# Patient Record
Sex: MALE | Race: WHITE | URBAN - METROPOLITAN AREA
[De-identification: names, ages, dates, MRNs, and addresses within clinical notes are randomized per-mention and may not be internally consistent; named-entity substitution may affect disease eponyms.]

---

## 2025-04-03 ENCOUNTER — OFFICE VISIT (OUTPATIENT)
Age: 50
End: 2025-04-03

## 2025-04-03 VITALS
WEIGHT: 217 LBS | BODY MASS INDEX: 29.39 KG/M2 | OXYGEN SATURATION: 95 % | SYSTOLIC BLOOD PRESSURE: 120 MMHG | TEMPERATURE: 99.1 F | DIASTOLIC BLOOD PRESSURE: 79 MMHG | HEIGHT: 72 IN | HEART RATE: 69 BPM

## 2025-04-03 DIAGNOSIS — Z12.11 SCREENING FOR COLON CANCER: Primary | ICD-10-CM

## 2025-04-03 PROCEDURE — NBSRV NON-BILLABLE SERVICE: Performed by: NURSE PRACTITIONER

## 2025-04-03 RX ORDER — ROSUVASTATIN CALCIUM 10 MG/1
10 TABLET, COATED ORAL DAILY
COMMUNITY
Start: 2025-03-20

## 2025-04-03 ASSESSMENT — ENCOUNTER SYMPTOMS
RHINORRHEA: 0
SHORTNESS OF BREATH: 0
COUGH: 0
SORE THROAT: 0

## 2025-04-03 NOTE — PROGRESS NOTES
atraumatic.      Right Ear: External ear normal.      Left Ear: External ear normal.      Nose: Nose normal.   Eyes:      Conjunctiva/sclera: Conjunctivae normal.   Neck:      Trachea: No tracheal deviation.   Cardiovascular:      Rate and Rhythm: Normal rate.   Pulmonary:      Effort: Pulmonary effort is normal. No accessory muscle usage or respiratory distress.   Abdominal:      General: Bowel sounds are normal.      Palpations: Abdomen is soft.      Tenderness: There is no abdominal tenderness.   Skin:     General: Skin is warm and dry.   Neurological:      Mental Status: He is alert and oriented to person, place, and time.   Psychiatric:         Attention and Perception: Attention normal.         Mood and Affect: Mood normal.         Speech: Speech normal.         Behavior: Behavior normal.         Thought Content: Thought content normal.                Data  No results found for: \"WBC\", \"HGB\", \"HCT\", \"MCV\", \"PLT\"  No results found for: \"NA\", \"K\", \"CL\", \"CO2\", \"BUN\", \"CREATININE\", \"GLUCOSE\", \"CALCIUM\", \"LABALBU\", \"BILITOT\", \"ALKPHOS\", \"AST\", \"ALT\", \"LABGLOM\", \"GFRAA\", \"AGRATIO\", \"GLOB\"        Radiology Review:    CT Result (most recent):  No results found for this or any previous visit from the past 3650 days.      ASSESSMENT   49 y.o. male to be scheduled for colonoscopy   No prior colonoscopies  Patient has been feeling well over all, states bowels moving normally, no GI complaints.  Major medical hx: None  Prior ABD/pelvic surgeries: None  Blood thinning medications: None    PLAN  Patient to be scheduled for colonoscopy as detailed below:  Prep instructions discussed with patient, copy of instructions provided  Will follow up with patient in the office post procedure to discuss findings and further recommendations    SURGERY/PROCEDURE SCHEDULING  PROCEDURE NAME: Screening colonoscopy    PROCEDURE DIAGNOSIS: Screening for colon cancer    ANESTHESIA TYPE: General    BLOOD THINNERS: None    CLEARANCE:

## 2025-04-03 NOTE — PATIENT INSTRUCTIONS
The office will be calling you soon to schedule colonoscopy, please call our office if you do not receive a phone call within 1 week.        [facility]  Jabari Gan MD, FACS  Madhavi Fagan, ALIREZA-CNP  [facility], [address]  [place] [address]  Phone: [phone]  Fax: [phone]    Miralax (Polyethylene Glycol)  STOP Aspirin 7 Days prior to Procedure  STOP all other Blood Thinners 5 Days prior to Procedure    **DO NOT EAT ANY SOLID FOOD THE DAY BEFORE YOUR PROCEDURE**  **YOU MUST BE ON A CLEAR LIQUID DIET ONLY**    Approved Clear Liquids:  Any flavor of soda except Red or Purple  Fruit Juice without pulp  Coffee or tea without dairy products  Jell-O without fruit or toppings, No Red or Purple  Pop-kaushik or Italian Ice, No Red or Purple  Chicken or Beef broth and bouillon      DRINK PLENTY OF WATER THROUGHOUT THE DAY    At 10:00 am the day before your procedure, take all 4 Dulcolax Tablets.  At 6:00 pm the day before your procedure, mix the ENTIRE bottle of Miralax (238 gram or Close to it) with 64 ounces of Gatorade (NOT RED or PURPLE).  You must consume the entire 64 ounces by 8:00 pm.  Continue clear liquid diet up until midnight.    NOTHING TO EAT, DRINK, SMOKE, OR CHEW AFTER MIDNIGHT    You may brush your teeth, rinse, gargle, and spit.  Heart or Blood pressure medications ONLY with a small sip of water, unless otherwise directed.  You will be scheduled for a pre-admission phone call prior to your procedure for your chart to be reviewed with a nurse to give you more specific instructions.  Shower with regular soap and water.    YOU MUST HAVE AN ADULT EITHER DRIVE YOU OR RIDE IN A CAB WITH YOU

## 2025-04-09 ENCOUNTER — TELEPHONE (OUTPATIENT)
Age: 50
End: 2025-04-09

## 2025-04-09 ENCOUNTER — PREP FOR PROCEDURE (OUTPATIENT)
Age: 50
End: 2025-04-09

## 2025-04-09 DIAGNOSIS — Z12.11 COLON CANCER SCREENING: ICD-10-CM

## 2025-04-09 RX ORDER — BISACODYL 5 MG
TABLET, DELAYED RELEASE (ENTERIC COATED) ORAL
Qty: 4 TABLET | Refills: 0 | Status: SHIPPED | OUTPATIENT
Start: 2025-04-09

## 2025-04-09 RX ORDER — POLYETHYLENE GLYCOL 3350 17 G/17G
POWDER, FOR SOLUTION ORAL
Qty: 238 G | Refills: 0 | Status: SHIPPED | OUTPATIENT
Start: 2025-04-09

## 2025-04-09 RX ORDER — ONDANSETRON 4 MG/1
4 TABLET, FILM COATED ORAL EVERY 6 HOURS PRN
Qty: 10 TABLET | Refills: 0 | Status: SHIPPED | OUTPATIENT
Start: 2025-04-09

## 2025-04-09 NOTE — TELEPHONE ENCOUNTER
Patient called to schedule procedure  Pre op and bowel prep instructions discussed  Bowel prep order to [facility]    EM/SC/6-2-25 at 8:00am/Screening Colonoscopy/epic        PRE OP AND BOWEL PREP INSTRUCTIONS    STOP Aspirin 7 Days prior to Procedure  STOP all other Blood Thinners 5 Days prior to Procedure      **DO NOT EAT ANY SOLID FOOD THE DAY BEFORE YOUR PROCEDURE**  **YOU MUST BE ON A CLEAR LIQUID DIET ONLY**    Approved Clear Liquids:  Any flavor of water or soda except Red or Purple  Fruit Juice without pulp  Coffee or tea without dairy products  Jell-O without fruit or toppings, No Red or Purple  Pop-kaushik or Italian Ice, No Red or Purple  Chicken or Beef broth and bouillon      DRINK PLENTY OF WATER THROUGHOUT THE DAY    At 10:00 am the day before your procedure, take all 4 Dulcolax Tablets.  At 6:00 pm the day before your procedure, mix the ENTIRE bottle of Miralax (238 grams or 8.3oz) with 64 ounces of Gatorade, Gatorade Zero or Propel Water (NOT RED or PURPLE).  You must consume the entire 64 ounces by 8:00 pm.  Continue clear liquid diet up until midnight.    NOTHING TO EAT, DRINK, SMOKE, OR CHEW AFTER MIDNIGHT    You may brush your teeth, rinse, gargle, and spit.  Heart or Blood pressure medications ONLY with a small sip of water, unless otherwise directed.  Shower with regular soap and water.    YOU MUST HAVE AN ADULT EITHER DRIVE YOU OR RIDE IN A CAB WITH YOU        MY PROCEDURE IS SCHEDULED FOR: Screening Colonoscopy    Date of Procedure: 6/2/25  Time: 8:00am  Arrival Time: 6:00am  Pre-Admission Testing Nurse Phone Call Assessment: 5/27/25 at 8:30am  Location:  [facility]     Your scripts have been sent to: [facility]    Follow Up Appointment at [facility] General Surgery: 6/16/25 at 8:30am

## 2025-04-16 RX ORDER — ONDANSETRON 4 MG/1
4 TABLET, FILM COATED ORAL EVERY 6 HOURS PRN
Qty: 10 TABLET | Refills: 0 | OUTPATIENT
Start: 2025-04-16

## 2025-05-09 PROBLEM — Z12.11 COLON CANCER SCREENING: Status: RESOLVED | Noted: 2025-04-09 | Resolved: 2025-05-09

## 2025-05-21 ENCOUNTER — TELEPHONE (OUTPATIENT)
Age: 50
End: 2025-05-21

## 2025-05-21 PROBLEM — Z12.11 COLON CANCER SCREENING: Status: ACTIVE | Noted: 2025-04-09

## 2025-05-21 NOTE — TELEPHONE ENCOUNTER
Patient called to reschedule procedure due to Daughter's Graduation.   New Procedure Date: 6/23/25   Procedure Time: 11:00am  Arrival Time: 9:00am  New Follow up date: 7/7/25 at 10:00am    Patient has already picked up Bowel Prep

## 2025-06-16 ENCOUNTER — HOSPITAL ENCOUNTER (OUTPATIENT)
Dept: PREADMISSION TESTING | Age: 50
Discharge: HOME | End: 2025-06-20

## 2025-06-16 VITALS — HEIGHT: 72 IN | WEIGHT: 217 LBS | BODY MASS INDEX: 29.39 KG/M2

## 2025-06-16 NOTE — PROGRESS NOTES

## 2025-06-19 NOTE — PRE-PROCEDURE INSTRUCTIONS
No answer, left message ?                             Unable to leave message ?    When were you told to arrive at hospital ?  0900    Do you have a  ?y    Are you on any blood thinners ?                     If yes when did you stop taking ?    Do you have your prep Rx filled and instruction ?      Nothing to eat the day before , only clear liquids.y    Are you experiencing any covid symptoms ?     Do you have any infections or rash we should be aware of ?      Do you have the Hibiclens soap to use the night before and the morning of surgery ?    Nothing to eat or drink after midnight, only a sip of water to take any medication instructed to take the night before.yy  Wear comfortable clothing, leave any valuables at home, remove any jewelry and body piercing . y

## 2025-06-20 ENCOUNTER — ANESTHESIA EVENT (OUTPATIENT)
Dept: ENDOSCOPY | Age: 50
End: 2025-06-20
Payer: COMMERCIAL

## 2025-06-20 PROBLEM — Z12.11 COLON CANCER SCREENING: Status: RESOLVED | Noted: 2025-04-09 | Resolved: 2025-06-20

## 2025-06-23 ENCOUNTER — ANESTHESIA (OUTPATIENT)
Dept: ENDOSCOPY | Age: 50
End: 2025-06-23
Payer: COMMERCIAL

## 2025-06-23 ENCOUNTER — HOSPITAL ENCOUNTER (OUTPATIENT)
Age: 50
Setting detail: OUTPATIENT SURGERY
Discharge: HOME | End: 2025-06-23
Attending: SURGERY | Admitting: SURGERY
Payer: COMMERCIAL

## 2025-06-23 VITALS
TEMPERATURE: 97.2 F | HEIGHT: 72 IN | SYSTOLIC BLOOD PRESSURE: 101 MMHG | RESPIRATION RATE: 16 BRPM | HEART RATE: 52 BPM | WEIGHT: 203 LBS | BODY MASS INDEX: 27.5 KG/M2 | OXYGEN SATURATION: 98 % | DIASTOLIC BLOOD PRESSURE: 72 MMHG

## 2025-06-23 DIAGNOSIS — Z12.11 COLON CANCER SCREENING: ICD-10-CM

## 2025-06-23 PROCEDURE — 7100000000 HC PACU RECOVERY - FIRST 15 MIN: Performed by: SURGERY

## 2025-06-23 PROCEDURE — 3700000001 HC ADD 15 MINUTES (ANESTHESIA): Performed by: SURGERY

## 2025-06-23 PROCEDURE — 6360000002 HC RX W HCPCS: Performed by: NURSE ANESTHETIST, CERTIFIED REGISTERED

## 2025-06-23 PROCEDURE — 3700000000 HC ANESTHESIA ATTENDED CARE: Performed by: SURGERY

## 2025-06-23 PROCEDURE — 88305 TISSUE EXAM BY PATHOLOGIST: CPT

## 2025-06-23 PROCEDURE — 7100000031 HC ASPR PHASE II RECOVERY - ADDTL 15 MIN: Performed by: SURGERY

## 2025-06-23 PROCEDURE — 7100000010 HC PHASE II RECOVERY - FIRST 15 MIN: Performed by: SURGERY

## 2025-06-23 PROCEDURE — 6360000002 HC RX W HCPCS: Performed by: ANESTHESIOLOGY

## 2025-06-23 PROCEDURE — 2580000003 HC RX 258: Performed by: ANESTHESIOLOGY

## 2025-06-23 PROCEDURE — 7100000030 HC ASPR PHASE II RECOVERY - FIRST 15 MIN: Performed by: SURGERY

## 2025-06-23 PROCEDURE — 7100000011 HC PHASE II RECOVERY - ADDTL 15 MIN: Performed by: SURGERY

## 2025-06-23 PROCEDURE — 7100000001 HC PACU RECOVERY - ADDTL 15 MIN: Performed by: SURGERY

## 2025-06-23 PROCEDURE — 2709999900 HC NON-CHARGEABLE SUPPLY: Performed by: SURGERY

## 2025-06-23 PROCEDURE — 3609010300 HC COLONOSCOPY W/BIOPSY SINGLE/MULTIPLE: Performed by: SURGERY

## 2025-06-23 RX ORDER — SIMETHICONE 40MG/0.6ML
SUSPENSION, DROPS(FINAL DOSAGE FORM)(ML) ORAL PRN
Status: DISCONTINUED | OUTPATIENT
Start: 2025-06-23 | End: 2025-06-23 | Stop reason: ALTCHOICE

## 2025-06-23 RX ORDER — LIDOCAINE HYDROCHLORIDE 10 MG/ML
1 INJECTION, SOLUTION EPIDURAL; INFILTRATION; INTRACAUDAL; PERINEURAL
Status: COMPLETED | OUTPATIENT
Start: 2025-06-23 | End: 2025-06-23

## 2025-06-23 RX ORDER — LIDOCAINE HYDROCHLORIDE 10 MG/ML
INJECTION, SOLUTION INFILTRATION; PERINEURAL
Status: DISCONTINUED | OUTPATIENT
Start: 2025-06-23 | End: 2025-06-23 | Stop reason: SDUPTHER

## 2025-06-23 RX ORDER — SODIUM CHLORIDE, SODIUM LACTATE, POTASSIUM CHLORIDE, CALCIUM CHLORIDE 600; 310; 30; 20 MG/100ML; MG/100ML; MG/100ML; MG/100ML
INJECTION, SOLUTION INTRAVENOUS CONTINUOUS
Status: DISCONTINUED | OUTPATIENT
Start: 2025-06-23 | End: 2025-06-23 | Stop reason: HOSPADM

## 2025-06-23 RX ORDER — SODIUM CHLORIDE 0.9 % (FLUSH) 0.9 %
5-40 SYRINGE (ML) INJECTION PRN
Status: DISCONTINUED | OUTPATIENT
Start: 2025-06-23 | End: 2025-06-23 | Stop reason: HOSPADM

## 2025-06-23 RX ORDER — ONDANSETRON 2 MG/ML
INJECTION INTRAMUSCULAR; INTRAVENOUS
Status: DISCONTINUED | OUTPATIENT
Start: 2025-06-23 | End: 2025-06-23 | Stop reason: SDUPTHER

## 2025-06-23 RX ORDER — PROPOFOL 10 MG/ML
INJECTION, EMULSION INTRAVENOUS
Status: DISCONTINUED | OUTPATIENT
Start: 2025-06-23 | End: 2025-06-23 | Stop reason: SDUPTHER

## 2025-06-23 RX ORDER — DEXAMETHASONE SODIUM PHOSPHATE 4 MG/ML
INJECTION, SOLUTION INTRA-ARTICULAR; INTRALESIONAL; INTRAMUSCULAR; INTRAVENOUS; SOFT TISSUE
Status: DISCONTINUED | OUTPATIENT
Start: 2025-06-23 | End: 2025-06-23 | Stop reason: SDUPTHER

## 2025-06-23 RX ORDER — SODIUM CHLORIDE 0.9 % (FLUSH) 0.9 %
5-40 SYRINGE (ML) INJECTION EVERY 12 HOURS SCHEDULED
Status: DISCONTINUED | OUTPATIENT
Start: 2025-06-23 | End: 2025-06-23 | Stop reason: HOSPADM

## 2025-06-23 RX ORDER — SODIUM CHLORIDE 9 MG/ML
INJECTION, SOLUTION INTRAVENOUS PRN
Status: DISCONTINUED | OUTPATIENT
Start: 2025-06-23 | End: 2025-06-23 | Stop reason: HOSPADM

## 2025-06-23 RX ADMIN — LIDOCAINE HYDROCHLORIDE 50 MG: 10 INJECTION, SOLUTION INFILTRATION; PERINEURAL at 11:39

## 2025-06-23 RX ADMIN — LIDOCAINE HYDROCHLORIDE 1 ML: 10 INJECTION, SOLUTION EPIDURAL; INFILTRATION; INTRACAUDAL; PERINEURAL at 09:20

## 2025-06-23 RX ADMIN — PROPOFOL 200 MG: 10 INJECTION, EMULSION INTRAVENOUS at 11:39

## 2025-06-23 RX ADMIN — SODIUM CHLORIDE, SODIUM LACTATE, POTASSIUM CHLORIDE, AND CALCIUM CHLORIDE: .6; .31; .03; .02 INJECTION, SOLUTION INTRAVENOUS at 09:21

## 2025-06-23 RX ADMIN — DEXAMETHASONE SODIUM PHOSPHATE 4 MG: 4 INJECTION, SOLUTION INTRAMUSCULAR; INTRAVENOUS at 11:48

## 2025-06-23 RX ADMIN — ONDANSETRON 4 MG: 2 INJECTION, SOLUTION INTRAMUSCULAR; INTRAVENOUS at 11:48

## 2025-06-23 ASSESSMENT — PAIN - FUNCTIONAL ASSESSMENT
PAIN_FUNCTIONAL_ASSESSMENT: 0-10
PAIN_FUNCTIONAL_ASSESSMENT: 0-10
PAIN_FUNCTIONAL_ASSESSMENT: NONE - DENIES PAIN

## 2025-06-23 ASSESSMENT — ENCOUNTER SYMPTOMS
COUGH: 0
SHORTNESS OF BREATH: 0
SORE THROAT: 0

## 2025-06-23 ASSESSMENT — PAIN SCALES - GENERAL
PAINLEVEL_OUTOF10: 0
PAINLEVEL_OUTOF10: 0

## 2025-06-23 NOTE — ANESTHESIA POSTPROCEDURE EVALUATION
Department of Anesthesiology  Postprocedure Note    Patient: Juan Pablo Morales  MRN: [id number]  YOB: 1975  Date of evaluation: 6/23/2025    Procedure Summary       Date: 06/23/25 Room / Location: [place] [address] / [facility]    Anesthesia Start: 1136 Anesthesia Stop: 1225    Procedure: COLONOSCOPY BIOPSY Diagnosis:       Colon cancer screening      (Colon cancer screening [Z12.11])    Surgeons: Jabari Gan MD Responsible Provider: Rachel Morrow MD    Anesthesia Type: general ASA Status: 2            Anesthesia Type: No value filed.    Fabricio Phase I: Fabricio Score: 10    Fabricio Phase II: Fabricio Score: 10    Anesthesia Post Evaluation    Comments: POST- ANESTHESIA EVALUATION       Pt Name: Juan Pablo Morales  MRN: [id number]  YOB: 1975  Date of evaluation: 6/23/2025  Time:  2:01 PM      /72   Pulse 52   Temp 97.2 °F (36.2 °C) (Infrared)   Resp 16   Ht 1.829 m (6')   Wt 92.1 kg (203 lb)   SpO2 98%   BMI 27.53 kg/m²      Consciousness Level  Awake  Cardiopulmonary Status  Stable  Pain Adequately Treated YES  Nausea / Vomiting  NO  Adequate Hydration  YES  Anesthesia Related Complications NONE      Electronically signed by Rachel Morrow MD on 6/23/2025 at 2:01 PM      No notable events documented.

## 2025-06-23 NOTE — H&P
HISTORY and PHYSICAL  [facility]       NAME:  Juan Pablo Morales  MRN: [id number]   YOB: 1975   Date: 6/23/2025   Age: 49 y.o.  Gender: male       COMPLAINT AND PRESENT HISTORY:       Juan Pablo Morales is 49 y.o.  male, here for     Procedure(s):  COLORECTAL CANCER SCREENING, NOT HIGH RISK    Pre-Op Diagnosis Codes:      * Colon cancer screening [Z12.11]    Below italics is a portion of office visit note by Madhavi Fagan CNP dated 04/03/25: Reviewed   HISTORY OF PRESENT ILLNESS: 49 y.o. male presents to discuss scheduling a colonoscopy.      Colonoscopy:  Last Colonoscopy Date (if applicable):  None prior  Rectal Bleeding: no  Abdominal Pain: no  Changes in Bowel Habits: no  Nausea / Vomiting: no  Weight Loss: no  Family Hx of Colon CA: no     Major medical hx: None  Prior ABD/pelvic surgeries: None  Blood thinning medications: None    UPDATE  Denies changes to GI symptoms since office visit in April, 2025.     Completed and followed prescribed prep. NPO p MN. Denies taking any blood thinning medications. Denies recent or current chest pain/pressure, palpitations, SOB, recent URI, fever or chills.       RECENT LABS, IMAGING AND TESTING     No results found for: \"WBC\", \"RBC\", \"HGB\", \"HCT\", \"MCV\", \"MCH\", \"MCHC\", \"RDW\", \"PLT\", \"MPV\"     No results found for: \"NA\", \"K\", \"CL\", \"CO2\", \"BUN\", \"CREATININE\", \"GLUCOSE\", \"CALCIUM\", \"LABALBU\", \"BILITOT\", \"ALKPHOS\", \"AST\", \"ALT\"    No results found.    PAST MEDICAL HISTORY     Past Medical History:   Diagnosis Date    Hyperlipidemia        SURGICAL HISTORY       Past Surgical History:   Procedure Laterality Date    TONSILLECTOMY         FAMILY HISTORY     History reviewed. No pertinent family history.    SOCIAL HISTORY       Social History     Socioeconomic History    Marital status:      Spouse name: None    Number of children: None    Years of education: None    Highest education level: None   Tobacco Use    Smoking status: Never    Smokeless

## 2025-06-23 NOTE — OP NOTE
[facility] Surgery   Jabari Gan MD, FACS  Madhavi Fagan, APRN-CNP  [address], [address]  [place] [address]  P: [phone], F: [phone]    PROCEDURE NOTE    DATE OF PROCEDURE: 6/23/2025    SURGEON: Jabari Gan MD    ASSISTANT: None    PREOPERATIVE DIAGNOSIS: Screening colonoscopy    POSTOPERATIVE DIAGNOSIS: Ascending colon polyp.  Sigmoid diverticulosis.  Large hypertrophied anal papilla    OPERATION: Total colonoscopy to cecum with intubation of terminal ileum.  Ascending colon polypectomy with cold biopsy forceps    ANESTHESIA: General    ESTIMATED BLOOD LOSS: None    COMPLICATIONS: None     SPECIMENS:  Was Obtained: Ascending colon polyp    HISTORY: The patient is a 49 y.o. year old male with history of above preop diagnosis.  I recommended colonoscopy with possible biopsy or polypectomy and I explained the risk, benefits, expected outcome, and alternatives to the procedure.  Risks included but are not limited to bleeding, infection, respiratory distress, hypotension, and perforation of the colon and possibility of missing a lesion.  The patient understands and is in agreement.      PROCEDURE: The patient was given IV conscious sedation.  The patient's SPO2 remained above 90% throughout the procedure. Digital rectal exam was normal.  The colonoscope was inserted through the anus into the rectum and advanced under direct vision to the cecum without difficulty.  Terminal ileum was examined for approximately 2 inches.  The prep was good.      Findings:  Terminal ileum: normal    Cecum/Ascending colon: abnormal: Proximal ascending colon polyp removed with cold biopsy forceps    Transverse colon: normal    Descending/Sigmoid colon: abnormal: Sigmoid diverticulosis    Rectum/Anus: examined in normal and retroflexed positions and was abnormal: Large hypertrophied anal papilla    Withdrawal Time was (minutes): 18      Next screening colonoscopy: 5 years.  If screening is

## 2025-06-23 NOTE — ANESTHESIA PRE PROCEDURE
Department of Anesthesiology  Preprocedure Note       Name:  Juan Pablo Morales   Age:  49 y.o.  :  1975                                          MRN:  [id number]         Date:  2025      Surgeon: Surgeon(s):  Jabari Gan MD    Procedure: Procedure(s):  COLORECTAL CANCER SCREENING, NOT HIGH RISK    Medications prior to admission:   Prior to Admission medications    Medication Sig Start Date End Date Taking? Authorizing Provider   bisacodyl (DULCOLAX) 5 MG EC tablet Take all 4 dulcolax tablets together by mouth at 10:00 AM the day prior to procedure. 25  Yes Madhavi Fagan APRN - CNP   polyethylene glycol (GLYCOLAX) 17 GM/SCOOP powder Mix 238 grams of Miralax with 64 ounces of Gatorade (not red or purple) and consume all liquid between 6 PM and 8 PM the evening prior to procedure. 25  Yes Madhavi Fagan APRN - CNP   rosuvastatin (CRESTOR) 10 MG tablet Take 1 tablet by mouth daily 3/20/25  Yes Provider, MD Damian   ondansetron (ZOFRAN) 4 MG tablet Take 1 tablet by mouth every 6 hours as needed for Nausea or Vomiting 25   Madhavi Fagan APRN - CNP       Current medications:    Current Facility-Administered Medications   Medication Dose Route Frequency Provider Last Rate Last Admin   • lactated ringers infusion   IntraVENous Continuous [place]  mL/hr at 25 0921 New Bag at 25 0921   • sodium chloride flush 0.9 % injection 5-40 mL  5-40 mL IntraVENous 2 times per day aLke Luo MD       • sodium chloride flush 0.9 % injection 5-40 mL  5-40 mL IntraVENous PRN [place] MD       • 0.9 % sodium chloride infusion   IntraVENous PRN [place] MD           Allergies:  No Known Allergies    Problem List:    Patient Active Problem List   Diagnosis Code   (none) - all problems resolved or deleted       Past Medical History:        Diagnosis Date   • Hyperlipidemia        Past Surgical History:        Procedure Laterality Date   • TONSILLECTOMY

## 2025-06-23 NOTE — DISCHARGE INSTRUCTIONS
DISCHARGE INSTRUCTIONS FOR COLONOSCOPY    In order to continue your care at home, please follow the instructions below.    For General Anesthesia:  Do not drink any alcoholic beverages or make any legal or important decisions for 24 hours.    Do not drive or operate machinery for 24 hours.  You may return to work after 24 hours.        Diet    Drink plenty of fluids after surgery, unless you are on a fluid restriction.   After general anesthesia, start out eating lightly (broth, soup, bread, etc.) advancing as tolerated to your usual diet.  Try to avoid spicy or greasy/fatty foods for 24 hours.   Avoid milk/milk product for several hours.       Medications  Take medications as ordered by your surgeon.    Activities  Limit your activities for 24 hours.  Walk around to help pass gas.  You may shower.    Call your surgeon for the following:  If you have abdominal pain that is not relieved by passing gas.   For an oral temperature (by mouth) is 101 degrees or higher, chills or excessive sweating.  Persistent nausea or vomiting  Rectal bleeding (may be red, maroon, or black) or change in your bowel habits.  For any questions or concerns you may have.    Next screening colonoscopy: 5 years.

## 2025-06-24 LAB — SURGICAL PATHOLOGY REPORT: NORMAL

## 2025-07-24 ENCOUNTER — OFFICE VISIT (OUTPATIENT)
Age: 50
End: 2025-07-24
Payer: COMMERCIAL

## 2025-07-24 VITALS
DIASTOLIC BLOOD PRESSURE: 73 MMHG | HEART RATE: 72 BPM | SYSTOLIC BLOOD PRESSURE: 113 MMHG | TEMPERATURE: 99 F | OXYGEN SATURATION: 94 % | BODY MASS INDEX: 28.82 KG/M2 | WEIGHT: 212.8 LBS | HEIGHT: 72 IN

## 2025-07-24 DIAGNOSIS — K57.90 DIVERTICULOSIS: ICD-10-CM

## 2025-07-24 DIAGNOSIS — K64.4 ANAL SKIN TAG: Primary | ICD-10-CM

## 2025-07-24 DIAGNOSIS — K63.5 BENIGN COLON POLYP: ICD-10-CM

## 2025-07-24 DIAGNOSIS — K62.89 HYPERTROPHIED ANAL PAPILLA: ICD-10-CM

## 2025-07-24 PROCEDURE — 99214 OFFICE O/P EST MOD 30 MIN: CPT | Performed by: SURGERY

## 2025-07-24 NOTE — PROGRESS NOTES
[facility] General Surgery   Jabari Gan MD, FACS  Madhavi BARKERDedrick Rylan, APRN-CNP  [address], [address]  [place] [address]  P: [phone], F: [phone]    General and Robotic Surgery  Follow-Up Visit Note               PATIENT NAME: Juan Pablo Morales   :  1975   MRN: [id number]   PCP:  Unknown, Provider     TODAY'S DATE: 2025    Chief Complaint   Patient presents with    Follow-up     Follow up colonoscopy 2025       History of Present Illness  The patient presents for a follow-up visit after colonoscopy and polypectomy.    He reports no issues following his colonoscopy, with normal eating, drinking, and bowel movements. He has not observed any blood in his stool.    He does not feel the lump or notice any bleeding when wiping.  Colonoscopy findings pathology results my recommendations were all reviewed.    SOCIAL HISTORY  Marital Status:     DATE OF PROCEDURE: 2025     SURGEON: Jabari Gan MD     ASSISTANT: None     PREOPERATIVE DIAGNOSIS: Screening colonoscopy     POSTOPERATIVE DIAGNOSIS: Ascending colon polyp.  Sigmoid diverticulosis.  Large hypertrophied anal papilla     OPERATION: Total colonoscopy to cecum with intubation of terminal ileum.  Ascending colon polypectomy with cold biopsy forceps     ANESTHESIA: General     ESTIMATED BLOOD LOSS: None     COMPLICATIONS: None      SPECIMENS:  Was Obtained: Ascending colon polyp     HISTORY: The patient is a 49 y.o. year old male with history of above preop diagnosis.  I recommended colonoscopy with possible biopsy or polypectomy and I explained the risk, benefits, expected outcome, and alternatives to the procedure.  Risks included but are not limited to bleeding, infection, respiratory distress, hypotension, and perforation of the colon and possibility of missing a lesion.  The patient understands and is in agreement.      PROCEDURE: The patient was given IV conscious sedation.  The patient's SPO2

## 2025-08-06 ENCOUNTER — TELEPHONE (OUTPATIENT)
Age: 50
End: 2025-08-06

## 2025-08-07 PROBLEM — K64.4 ANAL SKIN TAG: Status: ACTIVE | Noted: 2025-08-07

## 2025-08-07 PROBLEM — K62.89 HYPERTROPHIED ANAL PAPILLA: Status: ACTIVE | Noted: 2025-08-07

## (undated) DEVICE — ENDOSCOPIC KIT 1.1+ 10 FT OP4 CA DE

## (undated) DEVICE — GLOVE ORANGE PI 7 1/2   MSG9075

## (undated) DEVICE — KIT CLN UP LIN W/ STD SAHARA TBL SHT 40X60IN DRAW/LIFT SHT

## (undated) DEVICE — SINGLE USE AIR/WATER, SUCTION AND BIOPSY VALVES SET: Brand: ORCAPOD™

## (undated) DEVICE — GOWN,POLY REINFORCED,LG: Brand: MEDLINE

## (undated) DEVICE — FORCEPS BX L240CM JAW DIA2.4MM ORNG L CAP W/ NDL DISP RAD

## (undated) DEVICE — GAUZE,SPONGE,4"X4",16PLY,STRL,LF,10/TRAY: Brand: MEDLINE